# Patient Record
(demographics unavailable — no encounter records)

---

## 2018-08-13 NOTE — MRI REPORT
Procedure Date:  08/11/2018   

Accession Number:  [id number] / [id number]                    

Procedure:  MRI - Cervical Spine W/O CPT Code:  

 

FULL RESULT:

 

 

EXAM:

MRI CERVICAL SPINE WITHOUT CONTRAST

 

EXAM DATE: 8/11/2018 04:51 PM.

 

CLINICAL HISTORY: PARESTHESIA OF SKIN.

 

COMPARISONS: None.

 

TECHNIQUE: Multiplanar, multisequence T1-weighted and fluid-sensitive 

sequences of the cervical spine without contrast. Other: None.

 

FINDINGS:

Neurologic Structures: The visualized posterior fossa structures are 

unremarkable. No signal abnormality in the visualized spinal cord.

 

Alignment: No scoliosis or spondylolisthesis.

 

Bone Marrow: No gross fractures or bone lesions. No marrow edema.

 

Interspace Levels/Facets:

C1-C2: Unremarkable.

 

C2-C3: Unremarkable.

 

C3-C4: Unremarkable.

 

C4-C5: Unremarkable.

 

C5-C6: Mild disk space dehydration without significant narrowing. Small 

midline posterior disk protrusion with mild ventral thecal sac 

indentation, but no cord impingement or significant canal stenosis. 

Patent foramina.

 

C6-C7: Unremarkable.

 

C7-T1: Unremarkable.

 

Musculature: Normal. No edema or fatty atrophy.

 

Other: The paravertebral and prevertebral soft tissues are normal.

IMPRESSION:

1. Mild degenerative disk disease at C5-C6 where there is a shallow 

posterior disk protrusion, but no significant stenosis or neural 

impingement.

2. Otherwise negative study. No focal cord signal abnormality.

 

[facility]